# Patient Record
Sex: FEMALE | Race: WHITE | NOT HISPANIC OR LATINO | URBAN - METROPOLITAN AREA
[De-identification: names, ages, dates, MRNs, and addresses within clinical notes are randomized per-mention and may not be internally consistent; named-entity substitution may affect disease eponyms.]

---

## 2018-08-03 ENCOUNTER — EMERGENCY (EMERGENCY)
Facility: HOSPITAL | Age: 18
LOS: 1 days | Discharge: HOME | End: 2018-08-03
Attending: EMERGENCY MEDICINE
Payer: COMMERCIAL

## 2018-08-03 VITALS
TEMPERATURE: 98 F | WEIGHT: 147.05 LBS | SYSTOLIC BLOOD PRESSURE: 126 MMHG | RESPIRATION RATE: 20 BRPM | OXYGEN SATURATION: 99 % | HEART RATE: 109 BPM | DIASTOLIC BLOOD PRESSURE: 74 MMHG

## 2018-08-03 LAB
ALBUMIN SERPL ELPH-MCNC: 4.3 G/DL — SIGNIFICANT CHANGE UP (ref 3.3–5)
ALP SERPL-CCNC: 65 U/L — SIGNIFICANT CHANGE UP (ref 40–120)
ALT FLD-CCNC: 19 U/L — SIGNIFICANT CHANGE UP (ref 10–45)
ANION GAP SERPL CALC-SCNC: 11 MMOL/L — SIGNIFICANT CHANGE UP (ref 5–17)
APPEARANCE UR: CLEAR — SIGNIFICANT CHANGE UP
AST SERPL-CCNC: 30 U/L — SIGNIFICANT CHANGE UP (ref 10–40)
BASOPHILS # BLD AUTO: 0 K/UL — SIGNIFICANT CHANGE UP (ref 0–0.2)
BASOPHILS NFR BLD AUTO: 0.6 % — SIGNIFICANT CHANGE UP (ref 0–2)
BILIRUB SERPL-MCNC: 0.3 MG/DL — SIGNIFICANT CHANGE UP (ref 0.2–1.2)
BILIRUB UR-MCNC: NEGATIVE — SIGNIFICANT CHANGE UP
BUN SERPL-MCNC: 16 MG/DL — SIGNIFICANT CHANGE UP (ref 7–23)
CALCIUM SERPL-MCNC: 9.4 MG/DL — SIGNIFICANT CHANGE UP (ref 8.4–10.5)
CHLORIDE SERPL-SCNC: 102 MMOL/L — SIGNIFICANT CHANGE UP (ref 96–108)
CO2 SERPL-SCNC: 27 MMOL/L — SIGNIFICANT CHANGE UP (ref 22–31)
COLOR SPEC: YELLOW — SIGNIFICANT CHANGE UP
CREAT SERPL-MCNC: 0.95 MG/DL — SIGNIFICANT CHANGE UP (ref 0.5–1.3)
DIFF PNL FLD: NEGATIVE — SIGNIFICANT CHANGE UP
EOSINOPHIL # BLD AUTO: 0.1 K/UL — SIGNIFICANT CHANGE UP (ref 0–0.5)
EOSINOPHIL NFR BLD AUTO: 1.6 % — SIGNIFICANT CHANGE UP (ref 0–6)
EPI CELLS # UR: SIGNIFICANT CHANGE UP /HPF
GLUCOSE SERPL-MCNC: 96 MG/DL — SIGNIFICANT CHANGE UP (ref 70–99)
GLUCOSE UR QL: NEGATIVE — SIGNIFICANT CHANGE UP
HCT VFR BLD CALC: 36.5 % — SIGNIFICANT CHANGE UP (ref 34.5–45)
HGB BLD-MCNC: 12.6 G/DL — SIGNIFICANT CHANGE UP (ref 11.5–15.5)
KETONES UR-MCNC: NEGATIVE — SIGNIFICANT CHANGE UP
LEUKOCYTE ESTERASE UR-ACNC: NEGATIVE — SIGNIFICANT CHANGE UP
LYMPHOCYTES # BLD AUTO: 3.1 K/UL — SIGNIFICANT CHANGE UP (ref 1–3.3)
LYMPHOCYTES # BLD AUTO: 44.5 % — HIGH (ref 13–44)
MCHC RBC-ENTMCNC: 31.4 PG — SIGNIFICANT CHANGE UP (ref 27–34)
MCHC RBC-ENTMCNC: 34.6 GM/DL — SIGNIFICANT CHANGE UP (ref 32–36)
MCV RBC AUTO: 90.9 FL — SIGNIFICANT CHANGE UP (ref 80–100)
MONOCYTES # BLD AUTO: 0.6 K/UL — SIGNIFICANT CHANGE UP (ref 0–0.9)
MONOCYTES NFR BLD AUTO: 7.9 % — SIGNIFICANT CHANGE UP (ref 2–14)
NEUTROPHILS # BLD AUTO: 3.2 K/UL — SIGNIFICANT CHANGE UP (ref 1.8–7.4)
NEUTROPHILS NFR BLD AUTO: 45.4 % — SIGNIFICANT CHANGE UP (ref 43–77)
NITRITE UR-MCNC: NEGATIVE — SIGNIFICANT CHANGE UP
PH UR: 6.5 — SIGNIFICANT CHANGE UP (ref 5–8)
PLATELET # BLD AUTO: 194 K/UL — SIGNIFICANT CHANGE UP (ref 150–400)
POTASSIUM SERPL-MCNC: 3.6 MMOL/L — SIGNIFICANT CHANGE UP (ref 3.5–5.3)
POTASSIUM SERPL-SCNC: 3.6 MMOL/L — SIGNIFICANT CHANGE UP (ref 3.5–5.3)
PROT SERPL-MCNC: 7.5 G/DL — SIGNIFICANT CHANGE UP (ref 6–8.3)
PROT UR-MCNC: NEGATIVE — SIGNIFICANT CHANGE UP
RBC # BLD: 4.02 M/UL — SIGNIFICANT CHANGE UP (ref 3.8–5.2)
RBC # FLD: 12.7 % — SIGNIFICANT CHANGE UP (ref 10.3–14.5)
SODIUM SERPL-SCNC: 140 MMOL/L — SIGNIFICANT CHANGE UP (ref 135–145)
SP GR SPEC: 1.01 — SIGNIFICANT CHANGE UP (ref 1.01–1.02)
UROBILINOGEN FLD QL: NEGATIVE — SIGNIFICANT CHANGE UP
WBC # BLD: 7 K/UL — SIGNIFICANT CHANGE UP (ref 3.8–10.5)
WBC # FLD AUTO: 7 K/UL — SIGNIFICANT CHANGE UP (ref 3.8–10.5)
WBC UR QL: SIGNIFICANT CHANGE UP /HPF (ref 0–5)

## 2018-08-03 PROCEDURE — 99284 EMERGENCY DEPT VISIT MOD MDM: CPT

## 2018-08-03 RX ORDER — SODIUM CHLORIDE 9 MG/ML
1000 INJECTION INTRAMUSCULAR; INTRAVENOUS; SUBCUTANEOUS ONCE
Qty: 0 | Refills: 0 | Status: COMPLETED | OUTPATIENT
Start: 2018-08-03 | End: 2018-08-03

## 2018-08-03 RX ADMIN — SODIUM CHLORIDE 1000 MILLILITER(S): 9 INJECTION INTRAMUSCULAR; INTRAVENOUS; SUBCUTANEOUS at 23:00

## 2018-08-03 RX ADMIN — SODIUM CHLORIDE 1000 MILLILITER(S): 9 INJECTION INTRAMUSCULAR; INTRAVENOUS; SUBCUTANEOUS at 22:00

## 2018-08-03 NOTE — ED PROVIDER NOTE - PROGRESS NOTE DETAILS
Attending MD Mixon: pt received sign out pending US to eval lenora, Attending MD Mixon: patient signed out to Dr. Cueva pending US to evaluate for appendicitis and serial abdominal exam Micaela Martinez DO: Pending US results. Patient reassessed & pain improved. Abd nontender. Attd:  Received sign out on patient pending re-evaluation.  US abdomen unable to identify appendix.  Patient reporting feeling improved.  Abdominal exam minimal tenderness, negative rosvings, not tender at [place], no rebound or guarding.  Clinically low suspicion for appendicitis at this time.  Discussed with patient, amenable to discharge will return to Emergency Department for worsening symptoms.  Misael Cueva M.D.

## 2018-08-03 NOTE — ED PROVIDER NOTE - OBJECTIVE STATEMENT
17 y/o F w/ PMH of OCD and ADHD presenting to the ED w/ a chief complaint of abdominal pain. Pt stated her pain started earlier today and was originally in the center of her stomach and then migrated to the right lower quadrant. Pt has never experienced pain like this before. No recent illnesses. She has been eating and drinking well. Subjective fever last night, none currently. No nausea or vomiting. States she has been constipated lately and cannot recall her last bowel movement. No vaginal discharge or history of ovarian cysts in the past. LMP was 1 month ago. No additional complaints at this time. 19 y/o F w/ PMH of OCD and ADHD presenting to the ED w/ a chief complaint of abdominal pain. Pt stated her pain started earlier today and was originally in the center of her stomach and then migrated to the right lower quadrant. Pt has never experienced pain like this before. No recent illnesses. She has been eating and drinking well. Subjective fever last night, none currently. No nausea or vomiting. States she has been constipated lately and cannot recall her last bowel movement. No vaginal discharge or history of ovarian cysts in the past. LMP was 1 month ago. No additional complaints at this time. Pain worse with movement

## 2018-08-03 NOTE — ED PROVIDER NOTE - MEDICAL DECISION MAKING DETAILS
17 y/o F w/ PMH of OCD and ADHD with abdominal pain. DDx include constipation vs. appendicitis vs. ovarian torsion vs. ovarian cyst vs. ectopic pregnancy. Will do urine pregnancy test, UA, lab, US abdomen and pelvis. Will consider CT abdomen pending results. Reassess. 17 y/o F w/ PMH of OCD and ADHD with abdominal pain. DDx include constipation vs. appendicitis vs. ovarian torsion vs. ovarian cyst vs. ectopic pregnancy. Will do urine pregnancy test, UA, lab, US abdomen and pelvis. Will consider CT abdomen pending results. Reassess.    Chioma Griggs MD - Attending Physician: Pt here with RLQ pain, initially periumbilical, LGF last night, worse with movement. Low likelihood for appy but given description of pain progression, +RLQ tenderness will get labs, US for eval

## 2018-08-03 NOTE — ED PROVIDER NOTE - NS ED ROS FT
GENERAL: No fever or chills, //             EYES: no change in vision, //             HEENT: no trouble swallowing or speaking, //             CARDIAC: no chest pain, //              PULMONARY: no cough or SOB, //             GI: +abdominal pain, no nausea or no vomiting, +constipation, //             : No changes in urination,  //            SKIN: no rashes,  //            NEURO: no headache,  //             MSK: No joint pain otherwise as HPI or negative.

## 2018-08-03 NOTE — ED ADULT NURSE NOTE - NSIMPLEMENTINTERV_GEN_ALL_ED
Implemented All Universal Safety Interventions:  [place] to call system. Call bell, personal items and telephone within reach. Instruct patient to call for assistance. Room bathroom lighting operational. Non-slip footwear when patient is off stretcher. Physically safe environment: no spills, clutter or unnecessary equipment. Stretcher in lowest position, wheels locked, appropriate side rails in place.

## 2018-08-03 NOTE — ED PROVIDER NOTE - ATTENDING CONTRIBUTION TO CARE
Chioma Griggs MD - Attending Physician: I have personally seen and examined this patient with the resident/fellow.  I have fully participated in the care of this patient. I have reviewed all pertinent clinical information, including history, physical exam, plan and the Resident/Fellow’s note and agree except as noted. See MDM

## 2018-08-03 NOTE — ED ADULT NURSE NOTE - OBJECTIVE STATEMENT
18 year old female presents with mid abd pain that radiates to the right lower quadrant.  No nausea/vomiting / diarrhea. reports feeling constipation and has a BM Q3days.  Reports positional changes worsen and alleviates 4/10 pain. Pt states she has been increasing her protein intake. Subjective fevers, no dysuria/hematuria/urinary frequency.  Abd soft nontender, nondistended. Reports pain to RLQ.  Lungs cta unlabored. no cough/chills. Reports beta quant was negative yesterday. HSV diagnosed. denies STI hx.  +sexually active with condom use. BCP d/c'd 2 months ago.  Pt states her period ended 7/7/18.  States her abd pain feels like menstrual pains. Provided a urine sample .

## 2018-08-03 NOTE — ED PROVIDER NOTE - PHYSICAL EXAMINATION
Gen: NAD, AOx3, able to make her needs known, non-toxic //            Head: NCAT //            HEENT: oral mucosa moist, normal conjunctiva //            Lung: CTAB, no respiratory distress, no wheezes/rhonchi/rales B/L, speaking in full sentences. //            CV: RRR, no murmurs or rubs //            Abd: soft, ND, RLQ tenderness, no rebound tenderness, mild tenderness on obturator exam, negative psoas sign, no guarding, no CVA tenderness //            MSK: no visible deformities //            Neuro: No focal sensory or motor deficits //            Skin: Warm, well perfused //            Psych: normal affect.

## 2018-08-04 VITALS
DIASTOLIC BLOOD PRESSURE: 66 MMHG | OXYGEN SATURATION: 100 % | HEART RATE: 62 BPM | SYSTOLIC BLOOD PRESSURE: 118 MMHG | RESPIRATION RATE: 20 BRPM

## 2018-08-04 PROCEDURE — 76705 ECHO EXAM OF ABDOMEN: CPT | Mod: 26

## 2018-08-04 PROCEDURE — 76856 US EXAM PELVIC COMPLETE: CPT

## 2018-08-04 PROCEDURE — 76705 ECHO EXAM OF ABDOMEN: CPT

## 2018-08-04 PROCEDURE — 76830 TRANSVAGINAL US NON-OB: CPT | Mod: 26

## 2018-08-04 PROCEDURE — 76830 TRANSVAGINAL US NON-OB: CPT

## 2018-08-04 PROCEDURE — 99284 EMERGENCY DEPT VISIT MOD MDM: CPT | Mod: 25

## 2018-08-04 PROCEDURE — 85027 COMPLETE CBC AUTOMATED: CPT

## 2018-08-04 PROCEDURE — 81001 URINALYSIS AUTO W/SCOPE: CPT

## 2018-08-04 PROCEDURE — 93975 VASCULAR STUDY: CPT | Mod: 26

## 2018-08-04 PROCEDURE — 96360 HYDRATION IV INFUSION INIT: CPT

## 2018-08-04 PROCEDURE — 76856 US EXAM PELVIC COMPLETE: CPT | Mod: 26,59

## 2018-08-04 PROCEDURE — 80053 COMPREHEN METABOLIC PANEL: CPT

## 2018-08-04 PROCEDURE — 93975 VASCULAR STUDY: CPT

## 2022-08-22 NOTE — ED ADULT TRIAGE NOTE - MODE OF ARRIVAL
Walk in
balance training/bed mobility training/gait training/motor coordination training/neuromuscular re-education/postural re-education/strengthening/transfer training